# Patient Record
Sex: FEMALE | Race: WHITE
[De-identification: names, ages, dates, MRNs, and addresses within clinical notes are randomized per-mention and may not be internally consistent; named-entity substitution may affect disease eponyms.]

---

## 2022-08-11 ENCOUNTER — HOSPITAL ENCOUNTER (OUTPATIENT)
Dept: HOSPITAL 47 - RADMAMWWP | Age: 42
Discharge: HOME | End: 2022-08-11
Attending: OBSTETRICS & GYNECOLOGY
Payer: COMMERCIAL

## 2022-08-11 DIAGNOSIS — Z12.31: Primary | ICD-10-CM

## 2022-08-11 PROCEDURE — 77067 SCR MAMMO BI INCL CAD: CPT

## 2022-08-11 PROCEDURE — 77063 BREAST TOMOSYNTHESIS BI: CPT

## 2022-08-12 NOTE — MM
Reason for Exam: Screening  (asymptomatic). 





Patient History: 

Menarche at age 16. Hormonal Contraceptives, from age 18 until age 26. 





Risk Values: 

Jolanta 5 year model risk: 0.4%.

NCI Lifetime model risk: 6.7%.





Prior Study Comparison: 

No prior studies available for comparison. 





Tissue Density: 

The breast tissue is heterogeneously dense. This may lower the sensitivity of mammography.





Findings: 

Analyzed By CAD. 

Area of nodular asymmetric density central posterior left MLO view incompletely disperses on 3-D

images. This may represent superimposition shadow but further evaluation is recommended. Faint

grouped microcalcifications superior anterior left MLO view also warrant further evaluation with

magnification views. Otherwise, no significant mass, suspicious microcalcification, or other

discrete abnormality is seen. 





Overall Assessment: Incomplete: need additional imaging evaluation, BI-RAD 0





Management: 

Special View Mammogram of the left breast.

To include spot mag CC, spot mag lateral, 3-D lateral, and spot 3-D MLO views (for the anterior 12-1

o'clock microcalcifications and for the central posterior left MLO asymmetric density). Targeted

ultrasound left breast for any persisting abnormality.



Electronically signed and approved by: Maria Alejandra Ziegler M.D. Radiologist

## 2022-08-17 ENCOUNTER — HOSPITAL ENCOUNTER (OUTPATIENT)
Dept: HOSPITAL 47 - RADMAMWWP | Age: 42
Discharge: HOME | End: 2022-08-17
Attending: OBSTETRICS & GYNECOLOGY
Payer: COMMERCIAL

## 2022-08-17 DIAGNOSIS — R92.1: Primary | ICD-10-CM

## 2022-08-17 PROCEDURE — 77065 DX MAMMO INCL CAD UNI: CPT

## 2022-08-17 PROCEDURE — 77061 BREAST TOMOSYNTHESIS UNI: CPT

## 2022-08-17 NOTE — MM
Reason for Exam: Additional evaluation requested from abnormal screening. 

Last screening mammogram was performed less than 1 month ago.





Patient History: 

Menarche at age 16. Patient has no children. Hormonal Contraceptives, from age 18 until age 26.

Last menstrual period: 07/31/2022





Risk Values: 

Jolanta 5 year model risk: 0.6%.

NCI Lifetime model risk: 10.2%.





Prior Study Comparison: 

8/11/2022 Bilateral MG 3D screening mammo w/cad, [facility]. 





Tissue Density: 

Left: The breast tissue is heterogeneously dense. This may lower the sensitivity of mammography.





Findings: 

Analyzed By CAD. 

Grouped microcalcifications 12:00 left breast are faint but punctate and slightly heterogeneous.

Sampling is recommended. The central posterior MLO asymmetric density disperses on additional views.







Overall Assessment: Suspicious, BI-RAD 4





Management: 

Stereotactic Core Biopsy of the left breast.

For the 12:00 left breast microcalcifications. These can be targeted with a CC from above approach.



Electronically signed and approved by: Maria Alejandra Ziegler M.D. Radiologist

## 2022-09-01 ENCOUNTER — HOSPITAL ENCOUNTER (OUTPATIENT)
Dept: HOSPITAL 47 - RADMAMWWP | Age: 42
End: 2022-09-01
Attending: SURGERY
Payer: COMMERCIAL

## 2022-09-01 VITALS — DIASTOLIC BLOOD PRESSURE: 75 MMHG | HEART RATE: 86 BPM | TEMPERATURE: 98.2 F | SYSTOLIC BLOOD PRESSURE: 110 MMHG

## 2022-09-01 VITALS — RESPIRATION RATE: 16 BRPM

## 2022-09-01 DIAGNOSIS — N60.32: ICD-10-CM

## 2022-09-01 DIAGNOSIS — N60.22: Primary | ICD-10-CM

## 2022-09-01 DIAGNOSIS — N60.02: ICD-10-CM

## 2022-09-01 PROCEDURE — 19081 BX BREAST 1ST LESION STRTCTC: CPT

## 2022-09-01 PROCEDURE — 88305 TISSUE EXAM BY PATHOLOGIST: CPT

## 2022-09-06 NOTE — MM
Risk Values: 

Jolanta 5 year model risk: 0.6%.

NCI Lifetime model risk: 10.2%.





Prior Study Comparison: 

8/11/2022 Bilateral MG 3D screening mammo w/cad, [facility]. 8/17/2022 Left MG 3D work up w/cad , [facility]. 

Pathology Description: 

Location: 11 o'clock, central.

Marker Left Behind.   Specimen Radiograph.

Approach: CC FA Needle Type: Eviva Cores: 7 Skin Nicks: 1 Gauge: 9

The procedure of stereotactic guided core biopsy was explained to the patient.  Benefits,

alternatives, and risks were discussed.  An informed consent was then obtained.



The shortness pathway for biopsy was chosen.  Shortness pathway was cranial to caudal approach. I

performed the localization, then perform the remainder of the procedure. Overlying skin is cleansed

with Betadine. Lidocaine is used as anesthetic into the skin and deeper tissue. Lidocaine with

epinephrine is used as anesthetic to the deeper tissue while sampling. A vacuum assisted biopsy gun

was used to obtain multiple core samples.



The patient tolerated the procedure well without any immediate complication.  The patient was kept

in the radiology department for short stay after the procedure and then discharged home in stable

condition.  Targeted calcifications are identified in specimen mammogram.  Post biopsy mammogram

shows the clip to appear in satisfactory position relative to the targeted area of concern on the

preprocedure images.



Impression:



SUCCESSFUL, UNCOMPLICATED STEREOTACTIC GUIDED CORE BIOPSY OF AREA OF CONCERN IN THE LEFT BREAST. 





Pathology Results: 

Result: Benign, Fibrocystic change.

LEFT BREAST, STEREOTACTIC NEEDLE CORE BIOPSY: Fibrocystic changes including sclerosing adenosis with

calcifications, columnar cell change, fibrosis and cysts. 





Overall Assessment: Benign





Management: 

Diagnostic Mammogram of the left breast in 6 months.

Electronically signed and approved by: Chirag Gómez M.D.

## 2022-10-05 ENCOUNTER — HOSPITAL ENCOUNTER (OUTPATIENT)
Dept: HOSPITAL 47 - LABWHC1 | Age: 42
Discharge: HOME | End: 2022-10-05
Attending: FAMILY MEDICINE
Payer: COMMERCIAL

## 2022-10-05 DIAGNOSIS — Z00.01: Primary | ICD-10-CM

## 2022-10-05 DIAGNOSIS — Z13.1: ICD-10-CM

## 2022-10-05 LAB
ALBUMIN SERPL-MCNC: 4.2 G/DL (ref 3.8–4.9)
ALBUMIN/GLOB SERPL: 1.6 G/DL (ref 1.6–3.17)
ALP SERPL-CCNC: 74 U/L (ref 41–126)
ALT SERPL-CCNC: 12 U/L (ref 8–44)
ANION GAP SERPL CALC-SCNC: 8.4 MMOL/L (ref 10–18)
AST SERPL-CCNC: 19 U/L (ref 13–35)
BASOPHILS # BLD AUTO: 0.05 X 10*3/UL (ref 0–0.1)
BASOPHILS NFR BLD AUTO: 1 %
BUN SERPL-SCNC: 13.2 MG/DL (ref 9–27)
BUN/CREAT SERPL: 13.98 RATIO (ref 12–20)
CALCIUM SPEC-MCNC: 9.2 MG/DL (ref 8.7–10.3)
CHLORIDE SERPL-SCNC: 105 MMOL/L (ref 96–109)
CHOLEST SERPL-MCNC: 160 MG/DL (ref 0–200)
CO2 SERPL-SCNC: 26.2 MMOL/L (ref 20–27.5)
EOSINOPHIL # BLD AUTO: 0.17 X 10*3/UL (ref 0.04–0.35)
EOSINOPHIL NFR BLD AUTO: 3.4 %
ERYTHROCYTE [DISTWIDTH] IN BLOOD BY AUTOMATED COUNT: 4.8 X 10*6/UL (ref 4.1–5.2)
ERYTHROCYTE [DISTWIDTH] IN BLOOD: 12.6 % (ref 11.5–14.5)
GLOBULIN SER CALC-MCNC: 2.6 G/DL (ref 1.6–3.3)
GLUCOSE SERPL-MCNC: 94 MG/DL (ref 70–110)
HCT VFR BLD AUTO: 42.2 % (ref 37.2–46.3)
HDLC SERPL-MCNC: 65.1 MG/DL (ref 40–60)
HGB BLD-MCNC: 13.8 G/DL (ref 12–15)
IMM GRANULOCYTES BLD QL AUTO: 0.2 %
LDLC SERPL DIRECT ASSAY-MCNC: 84.9 MG/DL (ref 0–129)
LYMPHOCYTES # SPEC AUTO: 1.9 X 10*3/UL (ref 0.9–5)
LYMPHOCYTES NFR SPEC AUTO: 37.5 %
MCH RBC QN AUTO: 28.8 PG (ref 27–32)
MCHC RBC AUTO-ENTMCNC: 32.7 G/DL (ref 32–37)
MCV RBC AUTO: 87.9 FL (ref 80–97)
MONOCYTES # BLD AUTO: 0.62 X 10*3/UL (ref 0.2–1)
MONOCYTES NFR BLD AUTO: 12.3 %
NEUTROPHILS # BLD AUTO: 2.31 X 10*3/UL (ref 1.8–7.7)
NEUTROPHILS NFR BLD AUTO: 45.6 %
NRBC BLD AUTO-RTO: 0 /100 WBCS (ref 0–0)
PLATELET # BLD AUTO: 259 X 10*3/UL (ref 140–440)
POTASSIUM SERPL-SCNC: 4.7 MMOL/L (ref 3.5–5.5)
PROT SERPL-MCNC: 6.8 G/DL (ref 6.2–8.2)
SODIUM SERPL-SCNC: 140 MMOL/L (ref 135–145)
TRIGL SERPL-MCNC: 37.3 MG/DL (ref 0–149)
WBC # BLD AUTO: 5.06 X 10*3/UL (ref 4.5–10)

## 2022-10-05 PROCEDURE — 83036 HEMOGLOBIN GLYCOSYLATED A1C: CPT

## 2022-10-05 PROCEDURE — 80053 COMPREHEN METABOLIC PANEL: CPT

## 2022-10-05 PROCEDURE — 36415 COLL VENOUS BLD VENIPUNCTURE: CPT

## 2022-10-05 PROCEDURE — 84443 ASSAY THYROID STIM HORMONE: CPT

## 2022-10-05 PROCEDURE — 83721 ASSAY OF BLOOD LIPOPROTEIN: CPT

## 2022-10-05 PROCEDURE — 85025 COMPLETE CBC W/AUTO DIFF WBC: CPT

## 2022-10-05 PROCEDURE — 80061 LIPID PANEL: CPT

## 2023-03-22 ENCOUNTER — HOSPITAL ENCOUNTER (OUTPATIENT)
Dept: HOSPITAL 47 - RADMAMWWP | Age: 43
Discharge: HOME | End: 2023-03-22
Attending: SURGERY
Payer: MEDICAID

## 2023-03-22 DIAGNOSIS — Z98.890: ICD-10-CM

## 2023-03-22 DIAGNOSIS — R92.8: Primary | ICD-10-CM

## 2023-03-22 PROCEDURE — 77061 BREAST TOMOSYNTHESIS UNI: CPT

## 2023-03-22 PROCEDURE — 77065 DX MAMMO INCL CAD UNI: CPT

## 2023-03-22 NOTE — MM
Reason for Exam: Follow-up at short interval from prior study. 

Last screening mammogram was performed 7 month(s) ago.





Patient History: 

Menarche at age 16. Patient has no children. Hormonal Contraceptives, from age 18 until age 26.

09/01/2022, Benign MG stereo VAD BX LT on the left side. 





Risk Values: 

Jolanta 5 year model risk: 0.9%.

NCI Lifetime model risk: 12.4%.





Prior Study Comparison: 

8/11/2022 Bilateral MG 3D screening mammo w/cad, [facility]. 8/17/2022 Left MG 3D work up w/cad LT, [facility]. 





Tissue Density: 

Left: The breast tissue is heterogeneously dense. This may lower the sensitivity of mammography.





Findings: 

Analyzed By CAD. 

Post stereotactic core biopsy changes upper central left breast. No suspicious calcifications seen.

No evidence for mass or distortion. 





Overall Assessment: Benign, BI-RAD 2





Management: 

Screening Mammogram of both breasts in 6 months.

A clinical breast exam by your physician is recommended on an annual basis and results should be

correlated with mammographic findings.  This exam should not preclude additional follow-up of

suspicious palpable abnormalities.  Results were given to the patient verbally at the time of exam.



Electronically signed and approved by: Leopold M. Fregoli, M.D. Radiologis

## 2023-10-11 ENCOUNTER — HOSPITAL ENCOUNTER (OUTPATIENT)
Dept: HOSPITAL 47 - LABWHC1 | Age: 43
Discharge: HOME | End: 2023-10-11
Attending: FAMILY MEDICINE
Payer: MEDICAID

## 2023-10-11 DIAGNOSIS — Z13.1: ICD-10-CM

## 2023-10-11 DIAGNOSIS — Z00.01: Primary | ICD-10-CM

## 2023-10-11 DIAGNOSIS — Z12.31: ICD-10-CM

## 2023-10-11 DIAGNOSIS — Z51.81: ICD-10-CM

## 2023-10-11 LAB
ALBUMIN SERPL-MCNC: 4.1 D/DL (ref 3.8–4.9)
ALBUMIN/GLOB SERPL: 1.95 RATIO (ref 1.6–3.17)
ALP SERPL-CCNC: 70 U/L (ref 41–126)
ALT SERPL-CCNC: 26 U/L (ref 8–44)
ANION GAP SERPL CALC-SCNC: 9.3 MMOL/L (ref 4–12)
AST SERPL-CCNC: 23 U/L (ref 13–35)
BASOPHILS # BLD AUTO: 0.04 X 10*3/UL (ref 0–0.1)
BASOPHILS NFR BLD AUTO: 0.8 %
BUN SERPL-SCNC: 14.5 MG/DL (ref 9–27)
BUN/CREAT SERPL: 16.11 RATIO (ref 12–20)
CALCIUM SPEC-MCNC: 9 MG/DL (ref 8.7–10.3)
CHLORIDE SERPL-SCNC: 104 MMOL/L (ref 96–109)
CHOLEST SERPL-MCNC: 147 MG/DL (ref 0–200)
CO2 SERPL-SCNC: 27.7 MMOL/L (ref 21.6–31.8)
EOSINOPHIL # BLD AUTO: 0.1 X 10*3/UL (ref 0.04–0.35)
EOSINOPHIL NFR BLD AUTO: 2 %
ERYTHROCYTE [DISTWIDTH] IN BLOOD BY AUTOMATED COUNT: 4.57 X 10*6/UL (ref 4.1–5.2)
ERYTHROCYTE [DISTWIDTH] IN BLOOD: 12.7 % (ref 11.5–14.5)
GLOBULIN SER CALC-MCNC: 2.1 D/DL (ref 1.6–3.3)
GLUCOSE SERPL-MCNC: 95 MG/DL (ref 70–110)
HCT VFR BLD AUTO: 40.3 % (ref 37.2–46.3)
HDLC SERPL-MCNC: 66.3 MG/DL (ref 40–60)
HGB BLD-MCNC: 13.3 D/DL (ref 12–15)
IMM GRANULOCYTES BLD QL AUTO: 0.2 %
LDLC SERPL CALC-MCNC: 73.2 MG/DL (ref 0–131)
LYMPHOCYTES # SPEC AUTO: 1.82 X 10*3/UL (ref 0.9–5)
LYMPHOCYTES NFR SPEC AUTO: 37.3 %
MCH RBC QN AUTO: 29.1 PG (ref 27–32)
MCHC RBC AUTO-ENTMCNC: 33 D/DL (ref 32–37)
MCV RBC AUTO: 88.2 FL (ref 80–97)
MONOCYTES # BLD AUTO: 0.52 X 10*3/UL (ref 0.2–1)
MONOCYTES NFR BLD AUTO: 10.7 %
NEUTROPHILS # BLD AUTO: 2.39 X 10*3/UL (ref 1.8–7.7)
NEUTROPHILS NFR BLD AUTO: 49 %
NRBC BLD AUTO-RTO: 0 X 10*3/UL (ref 0–0.01)
PLATELET # BLD AUTO: 240 X 10*3/UL (ref 140–440)
POTASSIUM SERPL-SCNC: 4.6 MMOL/L (ref 3.5–5.5)
PROT SERPL-MCNC: 6.2 D/DL (ref 6.2–8.2)
SODIUM SERPL-SCNC: 141 MMOL/L (ref 135–145)
T4 FREE SERPL-MCNC: 1.08 NG/DL (ref 0.8–1.8)
TRIGL SERPL-MCNC: 37.7 MG/DL (ref 0–149)
VLDLC SERPL CALC-MCNC: 7.54 MG/DL (ref 5–40)
WBC # BLD AUTO: 4.88 X 10*3/UL (ref 4.5–10)

## 2023-10-11 PROCEDURE — 84439 ASSAY OF FREE THYROXINE: CPT

## 2023-10-11 PROCEDURE — 85025 COMPLETE CBC W/AUTO DIFF WBC: CPT

## 2023-10-11 PROCEDURE — 83036 HEMOGLOBIN GLYCOSYLATED A1C: CPT

## 2023-10-11 PROCEDURE — 77067 SCR MAMMO BI INCL CAD: CPT

## 2023-10-11 PROCEDURE — 84443 ASSAY THYROID STIM HORMONE: CPT

## 2023-10-11 PROCEDURE — 77063 BREAST TOMOSYNTHESIS BI: CPT

## 2023-10-11 PROCEDURE — 80053 COMPREHEN METABOLIC PANEL: CPT

## 2023-10-11 PROCEDURE — 82306 VITAMIN D 25 HYDROXY: CPT

## 2023-10-11 PROCEDURE — 80061 LIPID PANEL: CPT

## 2023-10-11 PROCEDURE — 36415 COLL VENOUS BLD VENIPUNCTURE: CPT

## 2023-10-12 NOTE — MM
Reason for Exam: Screening  (asymptomatic). 

Last mammogram was performed 1 year(s) and 2 month(s) ago. 





Patient History: 

Menarche at age 16. Patient has no children. Hormonal Contraceptives, from age 18 until age 26.

09/01/2022, Benign MG stereo VAD BX LT on the left side.

Last menstrual period: 09/29/2023





Risk Values: 

Jolanta 5 year model risk: 1.0%.

NCI Lifetime model risk: 12.2%.





Prior Study Comparison: 

8/11/2022 Bilateral MG 3D screening mammo w/cad, [facility]. 8/17/2022 Left MG 3D work up w/cad LT, [facility].

3/22/2023 Left MG diagnostic mammo LT w CAD, [facility]. 





Tissue Density: 

The breast tissue is heterogeneously dense. This may lower the sensitivity of mammography.





Findings: 

Analyzed By CAD. 

There is no suspicious group of microcalcifications or new suspicious mass in either breast. 





Overall Assessment: Benign, BI-RAD 2





Management: 

Screening Mammogram of both breasts in 1 year.

.



Patient should continue monthly self-breast exams.  A clinical breast exam by your physician is

recommended on an annual basis.

This exam should not preclude additional follow-up of suspicious palpable abnormalities.



Note on Jolanta scores and lifetime risk:

1. A Jolanta score greater than 3% is considered moderate risk. If this is the case, consider

specialist referral to assess eligibility for a risk reducing agent.

2. If overall lifetime risk for the development of breast cancer is 20% or higher, the patient may

qualify for future screening with alternating mammogram and breast MRI.



Electronically signed and approved by: Leopold M. Fregoli, M.D. Radiologis

## 2024-10-22 ENCOUNTER — HOSPITAL ENCOUNTER (OUTPATIENT)
Dept: HOSPITAL 47 - LABWHC1 | Age: 44
Discharge: HOME | End: 2024-10-22
Attending: FAMILY MEDICINE
Payer: MEDICAID

## 2024-10-22 LAB
ALBUMIN SERPL-MCNC: 3.9 G/DL (ref 3.8–4.9)
ALBUMIN/GLOB SERPL: 1.7 RATIO (ref 1.6–3.17)
ALP SERPL-CCNC: 75 U/L (ref 41–126)
ALT SERPL-CCNC: 28 U/L (ref 8–44)
ANION GAP SERPL CALC-SCNC: 10.7 MMOL/L (ref 4–12)
AST SERPL-CCNC: 27 U/L (ref 13–35)
BASOPHILS # BLD AUTO: 0.04 X 10*3/UL (ref 0–0.1)
BASOPHILS NFR BLD AUTO: 0.8 %
BUN SERPL-SCNC: 10.2 MG/DL (ref 9–27)
BUN/CREAT SERPL: 12.75 RATIO (ref 12–20)
CALCIUM SPEC-MCNC: 8.9 MG/DL (ref 8.7–10.3)
CHLORIDE SERPL-SCNC: 106 MMOL/L (ref 96–109)
CHOLEST SERPL-MCNC: 136 MG/DL (ref 0–200)
CO2 SERPL-SCNC: 22.3 MMOL/L (ref 21.6–31.8)
EOSINOPHIL # BLD AUTO: 0.11 X 10*3/UL (ref 0.04–0.35)
EOSINOPHIL NFR BLD AUTO: 2.3 %
ERYTHROCYTE [DISTWIDTH] IN BLOOD BY AUTOMATED COUNT: 4.6 X 10*6/UL (ref 4.1–5.2)
ERYTHROCYTE [DISTWIDTH] IN BLOOD: 12.5 % (ref 11.5–14.5)
FERRITIN SERPL-MCNC: 48.5 NG/ML (ref 10–291)
GLOBULIN SER CALC-MCNC: 2.3 G/DL (ref 1.6–3.3)
GLUCOSE SERPL-MCNC: 96 MG/DL (ref 70–110)
HCT VFR BLD AUTO: 40.6 % (ref 37.2–46.3)
HDLC SERPL-MCNC: 58.2 MG/DL (ref 40–60)
HGB BLD-MCNC: 13.5 G/DL (ref 12–15)
IMM GRANULOCYTES BLD QL AUTO: 0.2 %
IRON SERPL-MCNC: 137 UG/DL (ref 50–170)
LDLC SERPL CALC-MCNC: 68.9 MG/DL (ref 0–131)
LYMPHOCYTES # SPEC AUTO: 1.53 X 10*3/UL (ref 0.9–5)
LYMPHOCYTES NFR SPEC AUTO: 31.9 %
MCH RBC QN AUTO: 29.3 PG (ref 27–32)
MCHC RBC AUTO-ENTMCNC: 33.3 G/DL (ref 32–37)
MCV RBC AUTO: 88.3 FL (ref 80–97)
MONOCYTES # BLD AUTO: 0.52 X 10*3/UL (ref 0.2–1)
MONOCYTES NFR BLD AUTO: 10.9 %
NEUTROPHILS # BLD AUTO: 2.58 X 10*3/UL (ref 1.8–7.7)
NEUTROPHILS NFR BLD AUTO: 53.9 %
NRBC BLD AUTO-RTO: 0 X 10*3/UL (ref 0–0.01)
PLATELET # BLD AUTO: 250 X 10*3/UL (ref 140–440)
POTASSIUM SERPL-SCNC: 4.6 MMOL/L (ref 3.5–5.5)
PROT SERPL-MCNC: 6.2 G/DL (ref 6.2–8.2)
SODIUM SERPL-SCNC: 139 MMOL/L (ref 135–145)
TIBC SERPL-MCNC: 330 UG/DL (ref 228–460)
TRIGL SERPL-MCNC: 44.6 MG/DL (ref 0–149)
VIT B12 SERPL-MCNC: 1172 PG/ML (ref 200–944)
VLDLC SERPL CALC-MCNC: 8.92 MG/DL (ref 5–40)
WBC # BLD AUTO: 4.79 X 10*3/UL (ref 4.5–10)

## 2024-10-22 PROCEDURE — 85025 COMPLETE CBC W/AUTO DIFF WBC: CPT

## 2024-10-22 PROCEDURE — 80053 COMPREHEN METABOLIC PANEL: CPT

## 2024-10-22 PROCEDURE — 83550 IRON BINDING TEST: CPT

## 2024-10-22 PROCEDURE — 36415 COLL VENOUS BLD VENIPUNCTURE: CPT

## 2024-10-22 PROCEDURE — 82306 VITAMIN D 25 HYDROXY: CPT

## 2024-10-22 PROCEDURE — 82607 VITAMIN B-12: CPT

## 2024-10-22 PROCEDURE — 80061 LIPID PANEL: CPT

## 2024-10-22 PROCEDURE — 82728 ASSAY OF FERRITIN: CPT

## 2024-10-22 PROCEDURE — 83540 ASSAY OF IRON: CPT

## 2024-10-22 PROCEDURE — 82746 ASSAY OF FOLIC ACID SERUM: CPT

## 2024-10-22 PROCEDURE — 83036 HEMOGLOBIN GLYCOSYLATED A1C: CPT

## 2024-10-22 PROCEDURE — 84443 ASSAY THYROID STIM HORMONE: CPT

## 2024-10-29 ENCOUNTER — HOSPITAL ENCOUNTER (OUTPATIENT)
Dept: HOSPITAL 47 - RADMAMWWP | Age: 44
Discharge: HOME | End: 2024-10-29
Attending: OBSTETRICS & GYNECOLOGY
Payer: MEDICAID

## 2024-10-29 PROCEDURE — 77067 SCR MAMMO BI INCL CAD: CPT

## 2024-10-29 PROCEDURE — 77063 BREAST TOMOSYNTHESIS BI: CPT

## 2024-10-30 NOTE — MM
Reason for Exam: Screening  (asymptomatic). 

Last screening mammogram was performed 12 month(s) ago.





Patient History: 

Menarche at age 16. Patient has no children. Hormonal Contraceptives, from age 18 until age 26.

09/01/2022, Benign MG stereo VAD BX LT on the left side. 





Risk Values: 

Jolanta 5 year model risk: 1.1%.

NCI Lifetime model risk: 12.1%.





Prior Study Comparison: 

8/17/2022 Left MG 3D work up w/cad LT, [facility]. 3/22/2023 Left MG diagnostic mammo LT w CAD, [facility].

10/11/2023 Bilateral MG 3D screening mammo w/cad, [facility]. 





Tissue Density: 

The breasts are heterogeneously dense, which may obscure small masses.





Findings: 

Analyzed By CAD. 

Microclip left breast from prior biopsy. There are several groups of microcalcifications posterior

upper quadrant right breast which are either new/increased. On the MLO view, there may be an

associated density. Further magnification views recommended. Otherwise, no significant change. 





Overall Assessment: Incomplete: need additional imaging evaluation, BI-RAD 0





Management: 

Special View Mammogram of the right breast.

To include mag CC, mag ML, and 3-D ML views.  [facility] will attempt to contact patient

to return for supplemental views and ultrasound if indicated.



[facility], Workstation: [id number], 10/30/2024 9:28 AM.



Electronically signed and approved by: Maria Alejandra Ziegler M.D. Radiologist

## 2024-11-01 ENCOUNTER — HOSPITAL ENCOUNTER (OUTPATIENT)
Dept: HOSPITAL 47 - RADMAMWWP | Age: 44
Discharge: HOME | End: 2024-11-01
Attending: OBSTETRICS & GYNECOLOGY
Payer: MEDICAID

## 2024-11-01 DIAGNOSIS — R92.8: Primary | ICD-10-CM

## 2024-11-01 DIAGNOSIS — R92.333: ICD-10-CM

## 2024-11-01 PROCEDURE — 77065 DX MAMMO INCL CAD UNI: CPT

## 2024-11-01 PROCEDURE — 77061 BREAST TOMOSYNTHESIS UNI: CPT

## 2024-11-07 ENCOUNTER — HOSPITAL ENCOUNTER (OUTPATIENT)
Dept: HOSPITAL 47 - RADMAMWWP | Age: 44
End: 2024-11-07
Attending: SURGERY
Payer: MEDICAID

## 2024-11-07 VITALS — HEART RATE: 77 BPM | SYSTOLIC BLOOD PRESSURE: 109 MMHG | TEMPERATURE: 98.1 F | DIASTOLIC BLOOD PRESSURE: 72 MMHG

## 2024-11-07 VITALS — RESPIRATION RATE: 16 BRPM

## 2024-11-07 DIAGNOSIS — N60.11: Primary | ICD-10-CM

## 2024-11-07 PROCEDURE — 19081 BX BREAST 1ST LESION STRTCTC: CPT

## 2024-11-07 PROCEDURE — 88305 TISSUE EXAM BY PATHOLOGIST: CPT

## 2024-11-07 PROCEDURE — 19082 BX BREAST ADD LESION STRTCTC: CPT
